# Patient Record
Sex: FEMALE | Race: WHITE | NOT HISPANIC OR LATINO | Employment: UNEMPLOYED | URBAN - METROPOLITAN AREA
[De-identification: names, ages, dates, MRNs, and addresses within clinical notes are randomized per-mention and may not be internally consistent; named-entity substitution may affect disease eponyms.]

---

## 2024-01-10 ENCOUNTER — HOSPITAL ENCOUNTER (EMERGENCY)
Facility: HOSPITAL | Age: 24
Discharge: HOME | End: 2024-01-10
Attending: SPECIALIST
Payer: MEDICAID

## 2024-01-10 VITALS
OXYGEN SATURATION: 99 % | WEIGHT: 113 LBS | HEART RATE: 102 BPM | TEMPERATURE: 98 F | DIASTOLIC BLOOD PRESSURE: 93 MMHG | RESPIRATION RATE: 16 BRPM | BODY MASS INDEX: 18.83 KG/M2 | SYSTOLIC BLOOD PRESSURE: 137 MMHG | HEIGHT: 65 IN

## 2024-01-10 DIAGNOSIS — K01.1 IMPACTED THIRD MOLAR TOOTH: Primary | ICD-10-CM

## 2024-01-10 PROCEDURE — 99284 EMERGENCY DEPT VISIT MOD MDM: CPT

## 2024-01-10 PROCEDURE — 25000003 PHARM REV CODE 250: Performed by: SPECIALIST

## 2024-01-10 RX ORDER — AMOXICILLIN 500 MG/1
500 CAPSULE ORAL
Status: COMPLETED | OUTPATIENT
Start: 2024-01-10 | End: 2024-01-10

## 2024-01-10 RX ORDER — KETOROLAC TROMETHAMINE 10 MG/1
10 TABLET, FILM COATED ORAL EVERY 6 HOURS
Qty: 15 TABLET | Refills: 0 | Status: SHIPPED | OUTPATIENT
Start: 2024-01-10 | End: 2024-01-15

## 2024-01-10 RX ORDER — KETOROLAC TROMETHAMINE 10 MG/1
10 TABLET, FILM COATED ORAL
Status: COMPLETED | OUTPATIENT
Start: 2024-01-10 | End: 2024-01-10

## 2024-01-10 RX ORDER — AMOXICILLIN 500 MG/1
500 CAPSULE ORAL 3 TIMES DAILY
Qty: 21 CAPSULE | Refills: 0 | Status: SHIPPED | OUTPATIENT
Start: 2024-01-10 | End: 2024-01-17

## 2024-01-10 RX ADMIN — AMOXICILLIN 500 MG: 500 CAPSULE ORAL at 02:01

## 2024-01-10 RX ADMIN — KETOROLAC TROMETHAMINE 10 MG: 10 TABLET, FILM COATED ORAL at 02:01

## 2024-01-10 NOTE — ED PROVIDER NOTES
Encounter Date: 1/10/2024       History     Chief Complaint   Patient presents with    Dental Pain     Pt into ED with pain to left face and ear. S/S started a few weeks ago. Pt believes her wisdom teeth may be erupting.     Patient notes pain from her wisdom teeth that seems to affect all 4 but today the left lower wisdom tooth is hurting more than the others with radiation of pain to her ear, no fever; has not seen a dentist    The history is provided by the patient.     Review of patient's allergies indicates:  No Known Allergies  No past medical history on file.  No past surgical history on file.  No family history on file.     Review of Systems   Constitutional: Negative.    HENT:  Positive for dental problem.    Respiratory: Negative.     Cardiovascular: Negative.    Gastrointestinal: Negative.    Musculoskeletal: Negative.    Skin: Negative.    Neurological: Negative.    All other systems reviewed and are negative.      Physical Exam     Initial Vitals [01/10/24 0140]   BP Pulse Resp Temp SpO2   (!) 137/93 102 16 98.3 °F (36.8 °C) 99 %      MAP       --         Physical Exam    Nursing note and vitals reviewed.  Constitutional: She appears well-developed and well-nourished.   HENT:   Head: Normocephalic and atraumatic.   Right Ear: Tympanic membrane normal.   Left Ear: Tympanic membrane normal.   Appears to have impacted 3rd molars, left lower with gum inflammation, no facial swelling   Eyes: Pupils are equal, round, and reactive to light.   Neck: Neck supple.   Normal range of motion.  Cardiovascular:  Normal rate, regular rhythm, normal heart sounds and intact distal pulses.           Pulmonary/Chest: Breath sounds normal.   Musculoskeletal:         General: Normal range of motion.      Cervical back: Normal range of motion and neck supple.     Neurological: She is alert and oriented to person, place, and time. She has normal strength.   Skin: Skin is warm and dry.         ED Course   Procedures  Labs  Reviewed - No data to display       Imaging Results    None          Medications   amoxicillin capsule 500 mg (has no administration in time range)   ketorolac tablet 10 mg (has no administration in time range)     Medical Decision Making  Patient notes pain from her wisdom teeth that seems to affect all 4 but today the left lower wisdom tooth is hurting more than the others with radiation of pain to her ear, no fever; has not seen a dentist    DIFFERENTIAL DIAGNOSIS- 3rd molar intrusion, impaction, dental caries, dental abscess    Risk  Prescription drug management.  Risk Details: Referred to a dentist; will give amoxicillin and Toradol                                      Clinical Impression:  Final diagnoses:  [K01.1] Impacted third molar tooth (Primary)          ED Disposition Condition    Discharge Stable          ED Prescriptions       Medication Sig Dispense Start Date End Date Auth. Provider    amoxicillin (AMOXIL) 500 MG capsule Take 1 capsule (500 mg total) by mouth 3 (three) times daily. for 7 days 21 capsule 1/10/2024 1/17/2024 Narendra Frey MD    ketorolac (TORADOL) 10 mg tablet Take 1 tablet (10 mg total) by mouth every 6 (six) hours. for 5 days 15 tablet 1/10/2024 1/15/2024 Narendra Frey MD          Follow-up Information       Follow up With Specialties Details Why Contact Info    Dentist                 Narendra Frey MD  01/10/24 [id number]

## 2024-03-22 ENCOUNTER — HOSPITAL ENCOUNTER (EMERGENCY)
Facility: HOSPITAL | Age: 24
Discharge: HOME | End: 2024-03-23
Attending: SPECIALIST
Payer: MEDICAID

## 2024-03-22 VITALS
TEMPERATURE: 98 F | WEIGHT: 114 LBS | HEIGHT: 65 IN | RESPIRATION RATE: 18 BRPM | SYSTOLIC BLOOD PRESSURE: 141 MMHG | OXYGEN SATURATION: 100 % | HEART RATE: 97 BPM | BODY MASS INDEX: 18.99 KG/M2 | DIASTOLIC BLOOD PRESSURE: 93 MMHG

## 2024-03-22 DIAGNOSIS — K01.1 IMPACTED THIRD MOLAR TOOTH: ICD-10-CM

## 2024-03-22 DIAGNOSIS — K08.89 PAIN, DENTAL: Primary | ICD-10-CM

## 2024-03-22 PROCEDURE — 99284 EMERGENCY DEPT VISIT MOD MDM: CPT

## 2024-03-22 RX ORDER — AMOXICILLIN 500 MG/1
500 CAPSULE ORAL
Status: COMPLETED | OUTPATIENT
Start: 2024-03-23 | End: 2024-03-23

## 2024-03-22 RX ORDER — KETOROLAC TROMETHAMINE 10 MG/1
10 TABLET, FILM COATED ORAL EVERY 6 HOURS
Qty: 15 TABLET | Refills: 0 | Status: SHIPPED | OUTPATIENT
Start: 2024-03-22 | End: 2024-03-27

## 2024-03-22 RX ORDER — KETOROLAC TROMETHAMINE 10 MG/1
10 TABLET, FILM COATED ORAL
Status: COMPLETED | OUTPATIENT
Start: 2024-03-23 | End: 2024-03-23

## 2024-03-22 RX ORDER — AMOXICILLIN 500 MG/1
500 CAPSULE ORAL 3 TIMES DAILY
Qty: 21 CAPSULE | Refills: 0 | Status: SHIPPED | OUTPATIENT
Start: 2024-03-22 | End: 2024-03-29

## 2024-03-23 PROCEDURE — 25000003 PHARM REV CODE 250: Performed by: SPECIALIST

## 2024-03-23 RX ADMIN — KETOROLAC TROMETHAMINE 10 MG: 10 TABLET, FILM COATED ORAL at 12:03

## 2024-03-23 RX ADMIN — AMOXICILLIN 500 MG: 500 CAPSULE ORAL at 12:03

## 2024-03-23 NOTE — ED PROVIDER NOTES
Encounter Date: 3/22/2024       History     Chief Complaint   Patient presents with    Dental Pain     [place] teeth coming out     Patient presents with dental pain and notes her wisdom teeth are coming out; patient was seen previously for same issue but has not made it to the dentist yet; no fever    The history is provided by the patient.     Review of patient's allergies indicates:  No Known Allergies  No past medical history on file.  No past surgical history on file.  No family history on file.     Review of Systems   Constitutional: Negative.    HENT:  Positive for dental problem.    Respiratory: Negative.     Cardiovascular: Negative.    Gastrointestinal: Negative.    Musculoskeletal: Negative.    Skin: Negative.    Neurological: Negative.    All other systems reviewed and are negative.      Physical Exam     Initial Vitals [03/22/24 2331]   BP Pulse Resp Temp SpO2   (!) 141/93 97 18 97.6 °F (36.4 °C) 100 %      MAP       --         Physical Exam    Nursing note and vitals reviewed.  Constitutional: She appears well-developed and well-nourished.   HENT:   Head: Normocephalic and atraumatic.   Third molars erupting with inflamed gum tissue surrounding the teeth   Eyes: EOM are normal. Pupils are equal, round, and reactive to light.   Neck: Neck supple.   Normal range of motion.  Cardiovascular:  Normal rate, regular rhythm, normal heart sounds and intact distal pulses.           Pulmonary/Chest: Breath sounds normal.   Musculoskeletal:         General: Normal range of motion.      Cervical back: Normal range of motion and neck supple.     Neurological: She is alert and oriented to person, place, and time. She has normal strength.   Skin: Skin is warm and dry.         ED Course   Procedures  Labs Reviewed - No data to display       Imaging Results    None          Medications   amoxicillin capsule 500 mg (500 mg Oral Given 3/23/24 0001)   ketorolac tablet 10 mg (10 mg Oral Given 3/23/24 0001)     Medical  Decision Making  Patient presents with dental pain and notes her wisdom teeth are coming out; patient was seen previously for same issue but has not made it to the dentist yet; no fever    DIFFERENTIAL DIAGNOSIS- impacted 3rd molars, dental pain    Risk  Prescription drug management.  Risk Details: Patient given Amoxil 500 mg p.o. and Toradol 10 mg p.o.; prescription sent to her pharmacy and encouraged dental follow up                                      Clinical Impression:  Final diagnoses:  [K08.89] Pain, dental (Primary)  [K01.1] Impacted third molar tooth          ED Disposition Condition    Discharge Stable          ED Prescriptions       Medication Sig Dispense Start Date End Date Auth. Provider    amoxicillin (AMOXIL) 500 MG capsule Take 1 capsule (500 mg total) by mouth 3 (three) times daily. for 7 days 21 capsule 3/22/2024 3/29/2024 Narendra Frey MD    ketorolac (TORADOL) 10 mg tablet Take 1 tablet (10 mg total) by mouth every 6 (six) hours. for 5 days 15 tablet 3/22/2024 3/27/2024 Narendra Frey MD          Follow-up Information       Follow up With Specialties Details Why Contact Info    Dentist  Schedule an appointment as soon as possible for a visit                Narendra Frey MD  03/23/24 0005

## 2025-02-03 ENCOUNTER — HOSPITAL ENCOUNTER (EMERGENCY)
Facility: HOSPITAL | Age: 25
Discharge: ANOTHER HOSPITAL | End: 2025-02-04
Attending: SPECIALIST
Payer: MEDICAID

## 2025-02-03 DIAGNOSIS — N89.8 VAGINAL DISCHARGE: ICD-10-CM

## 2025-02-03 DIAGNOSIS — N70.93 TUBO-OVARIAN ABSCESS: Primary | ICD-10-CM

## 2025-02-03 LAB
BASOPHILS # BLD AUTO: 0.01 X10(3)/MCL
BASOPHILS NFR BLD AUTO: 0.1 %
EOSINOPHIL # BLD AUTO: 0.04 X10(3)/MCL (ref 0–0.9)
EOSINOPHIL NFR BLD AUTO: 0.3 %
ERYTHROCYTE [DISTWIDTH] IN BLOOD BY AUTOMATED COUNT: 12.6 % (ref 11.5–17)
HCT VFR BLD AUTO: 38 % (ref 37–47)
HGB BLD-MCNC: 11.7 G/DL (ref 12–16)
IMM GRANULOCYTES # BLD AUTO: 0.02 X10(3)/MCL (ref 0–0.04)
IMM GRANULOCYTES NFR BLD AUTO: 0.1 %
LYMPHOCYTES # BLD AUTO: 2.32 X10(3)/MCL (ref 0.6–4.6)
LYMPHOCYTES NFR BLD AUTO: 16.2 %
MCH RBC QN AUTO: 30.2 PG (ref 27–31)
MCHC RBC AUTO-ENTMCNC: 30.8 G/DL (ref 33–36)
MCV RBC AUTO: 97.9 FL (ref 80–94)
MONOCYTES # BLD AUTO: 0.65 X10(3)/MCL (ref 0.1–1.3)
MONOCYTES NFR BLD AUTO: 4.5 %
NEUTROPHILS # BLD AUTO: 11.27 X10(3)/MCL (ref 2.1–9.2)
NEUTROPHILS NFR BLD AUTO: 78.8 %
PLATELET # BLD AUTO: 274 X10(3)/MCL (ref 130–400)
PMV BLD AUTO: 9.7 FL (ref 7.4–10.4)
RBC # BLD AUTO: 3.88 X10(6)/MCL (ref 4.2–5.4)
WBC # BLD AUTO: 14.31 X10(3)/MCL (ref 4.5–11.5)

## 2025-02-03 PROCEDURE — 86141 C-REACTIVE PROTEIN HS: CPT | Performed by: SPECIALIST

## 2025-02-03 PROCEDURE — 25000003 PHARM REV CODE 250: Performed by: SPECIALIST

## 2025-02-03 PROCEDURE — 87040 BLOOD CULTURE FOR BACTERIA: CPT | Performed by: SPECIALIST

## 2025-02-03 PROCEDURE — 99285 EMERGENCY DEPT VISIT HI MDM: CPT | Mod: 25

## 2025-02-03 PROCEDURE — 96360 HYDRATION IV INFUSION INIT: CPT

## 2025-02-03 PROCEDURE — 80053 COMPREHEN METABOLIC PANEL: CPT | Performed by: SPECIALIST

## 2025-02-03 PROCEDURE — 83605 ASSAY OF LACTIC ACID: CPT | Performed by: SPECIALIST

## 2025-02-03 PROCEDURE — 63600175 PHARM REV CODE 636 W HCPCS: Performed by: SPECIALIST

## 2025-02-03 PROCEDURE — 85025 COMPLETE CBC W/AUTO DIFF WBC: CPT | Performed by: SPECIALIST

## 2025-02-03 RX ORDER — ONDANSETRON HYDROCHLORIDE 2 MG/ML
4 INJECTION, SOLUTION INTRAVENOUS
Status: COMPLETED | OUTPATIENT
Start: 2025-02-03 | End: 2025-02-03

## 2025-02-03 RX ORDER — ACETAMINOPHEN 500 MG
1000 TABLET ORAL
Status: COMPLETED | OUTPATIENT
Start: 2025-02-03 | End: 2025-02-03

## 2025-02-03 RX ORDER — MORPHINE SULFATE 4 MG/ML
4 INJECTION, SOLUTION INTRAMUSCULAR; INTRAVENOUS
Status: COMPLETED | OUTPATIENT
Start: 2025-02-03 | End: 2025-02-03

## 2025-02-03 RX ADMIN — ACETAMINOPHEN 1000 MG: 500 TABLET ORAL at 11:02

## 2025-02-03 RX ADMIN — SODIUM CHLORIDE 1000 ML: 9 INJECTION, SOLUTION INTRAVENOUS at 11:02

## 2025-02-03 RX ADMIN — MORPHINE SULFATE 4 MG: 4 INJECTION, SOLUTION INTRAMUSCULAR; INTRAVENOUS at 11:02

## 2025-02-03 RX ADMIN — ONDANSETRON 4 MG: 2 INJECTION INTRAMUSCULAR; INTRAVENOUS at 11:02

## 2025-02-04 ENCOUNTER — HOSPITAL ENCOUNTER (INPATIENT)
Facility: HOSPITAL | Age: 25
LOS: 2 days | Discharge: HOME | DRG: 758 | End: 2025-02-06
Attending: STUDENT IN AN ORGANIZED HEALTH CARE EDUCATION/TRAINING PROGRAM | Admitting: INTERNAL MEDICINE
Payer: MEDICAID

## 2025-02-04 VITALS
SYSTOLIC BLOOD PRESSURE: 116 MMHG | HEART RATE: 104 BPM | RESPIRATION RATE: 22 BRPM | OXYGEN SATURATION: 100 % | DIASTOLIC BLOOD PRESSURE: 76 MMHG | TEMPERATURE: 98 F | HEIGHT: 65 IN | BODY MASS INDEX: 18.33 KG/M2 | WEIGHT: 110 LBS

## 2025-02-04 DIAGNOSIS — R07.9 CHEST PAIN: ICD-10-CM

## 2025-02-04 DIAGNOSIS — R10.9 ABDOMINAL PAIN, UNSPECIFIED ABDOMINAL LOCATION: ICD-10-CM

## 2025-02-04 DIAGNOSIS — K52.9 ENTERITIS: Primary | ICD-10-CM

## 2025-02-04 LAB
ACCEPTIBLE SP GR UR QL: >=1.03 (ref 1–1.03)
ALBUMIN SERPL-MCNC: 4.2 G/DL (ref 3.5–5)
ALBUMIN/GLOB SERPL: 1.1 RATIO (ref 1.1–2)
ALP SERPL-CCNC: 94 UNIT/L (ref 40–150)
ALT SERPL-CCNC: 16 UNIT/L (ref 0–55)
AMPHET UR QL SCN: POSITIVE
ANION GAP SERPL CALC-SCNC: 13 MEQ/L
AST SERPL-CCNC: 22 UNIT/L (ref 5–34)
B-HCG UR QL: NEGATIVE
BACTERIA #/AREA URNS AUTO: ABNORMAL /HPF
BARBITURATE SCN PRESENT UR: NEGATIVE
BENZODIAZ UR QL SCN: NEGATIVE
BILIRUB SERPL-MCNC: 0.5 MG/DL
BILIRUB UR QL STRIP.AUTO: NEGATIVE
BUN SERPL-MCNC: 14 MG/DL (ref 7–18.7)
C TRACH DNA SPEC QL NAA+PROBE: NOT DETECTED
CALCIUM SERPL-MCNC: 9.6 MG/DL (ref 8.4–10.2)
CANNABINOIDS UR QL SCN: NEGATIVE
CHLORIDE SERPL-SCNC: 105 MMOL/L (ref 98–107)
CLARITY UR: ABNORMAL
CO2 SERPL-SCNC: 21 MMOL/L (ref 22–29)
COCAINE UR QL SCN: NEGATIVE
COLOR UR AUTO: YELLOW
CREAT SERPL-MCNC: 0.84 MG/DL (ref 0.55–1.02)
CREAT/UREA NIT SERPL: 17
CRP SERPL HS-MCNC: 35.65 MG/L
FENTANYL UR QL SCN: NEGATIVE
GFR SERPLBLD CREATININE-BSD FMLA CKD-EPI: >60 ML/MIN/1.73/M2
GLOBULIN SER-MCNC: 4 GM/DL (ref 2.4–3.5)
GLUCOSE SERPL-MCNC: 103 MG/DL (ref 74–100)
GLUCOSE UR QL STRIP: NEGATIVE
HGB UR QL STRIP: NEGATIVE
KETONES UR QL STRIP: ABNORMAL
LACTATE SERPL-SCNC: 0.7 MMOL/L (ref 0.5–2.2)
LACTATE SERPL-SCNC: 0.9 MMOL/L (ref 0.5–2.2)
LACTATE SERPL-SCNC: 2.3 MMOL/L (ref 0.5–2.2)
LEUKOCYTE ESTERASE UR QL STRIP: ABNORMAL
MUCOUS THREADS URNS QL MICRO: ABNORMAL /LPF
N GONORRHOEA DNA SPEC QL NAA+PROBE: NOT DETECTED
NITRITE UR QL STRIP: NEGATIVE
OPIATES UR QL SCN: POSITIVE
PCP UR QL: NEGATIVE
PH UR STRIP: 6 [PH]
PH UR: 6 [PH] (ref 3–11)
POTASSIUM SERPL-SCNC: 3.4 MMOL/L (ref 3.5–5.1)
PROT SERPL-MCNC: 8.2 GM/DL (ref 6.4–8.3)
PROT UR QL STRIP: 30
RBC #/AREA URNS AUTO: ABNORMAL /HPF
SODIUM SERPL-SCNC: 139 MMOL/L (ref 136–145)
SOURCE (OHS): NORMAL
SP GR UR STRIP.AUTO: >=1.03 (ref 1–1.03)
SQUAMOUS #/AREA URNS AUTO: ABNORMAL /HPF
UROBILINOGEN UR STRIP-ACNC: 0.2
WBC #/AREA URNS AUTO: ABNORMAL /HPF

## 2025-02-04 PROCEDURE — 83605 ASSAY OF LACTIC ACID: CPT | Performed by: SPECIALIST

## 2025-02-04 PROCEDURE — 63600175 PHARM REV CODE 636 W HCPCS: Performed by: SPECIALIST

## 2025-02-04 PROCEDURE — 80307 DRUG TEST PRSMV CHEM ANLYZR: CPT | Performed by: SPECIALIST

## 2025-02-04 PROCEDURE — 83605 ASSAY OF LACTIC ACID: CPT | Performed by: STUDENT IN AN ORGANIZED HEALTH CARE EDUCATION/TRAINING PROGRAM

## 2025-02-04 PROCEDURE — 87591 N.GONORRHOEAE DNA AMP PROB: CPT | Performed by: SPECIALIST

## 2025-02-04 PROCEDURE — 63600175 PHARM REV CODE 636 W HCPCS: Performed by: STUDENT IN AN ORGANIZED HEALTH CARE EDUCATION/TRAINING PROGRAM

## 2025-02-04 PROCEDURE — 96375 TX/PRO/DX INJ NEW DRUG ADDON: CPT

## 2025-02-04 PROCEDURE — 81001 URINALYSIS AUTO W/SCOPE: CPT | Performed by: SPECIALIST

## 2025-02-04 PROCEDURE — 25000003 PHARM REV CODE 250: Performed by: NURSE PRACTITIONER

## 2025-02-04 PROCEDURE — 96374 THER/PROPH/DIAG INJ IV PUSH: CPT

## 2025-02-04 PROCEDURE — 63600175 PHARM REV CODE 636 W HCPCS: Performed by: INTERNAL MEDICINE

## 2025-02-04 PROCEDURE — 63600175 PHARM REV CODE 636 W HCPCS: Mod: TB,JZ | Performed by: INTERNAL MEDICINE

## 2025-02-04 PROCEDURE — 25000003 PHARM REV CODE 250: Performed by: SPECIALIST

## 2025-02-04 PROCEDURE — 21400001 HC TELEMETRY ROOM

## 2025-02-04 PROCEDURE — 81025 URINE PREGNANCY TEST: CPT | Performed by: SPECIALIST

## 2025-02-04 PROCEDURE — 99285 EMERGENCY DEPT VISIT HI MDM: CPT | Mod: 25

## 2025-02-04 PROCEDURE — 25500020 PHARM REV CODE 255: Performed by: SPECIALIST

## 2025-02-04 PROCEDURE — 87086 URINE CULTURE/COLONY COUNT: CPT | Performed by: SPECIALIST

## 2025-02-04 RX ORDER — MORPHINE SULFATE 4 MG/ML
4 INJECTION, SOLUTION INTRAMUSCULAR; INTRAVENOUS
Status: COMPLETED | OUTPATIENT
Start: 2025-02-04 | End: 2025-02-04

## 2025-02-04 RX ORDER — PROCHLORPERAZINE EDISYLATE 5 MG/ML
5 INJECTION INTRAMUSCULAR; INTRAVENOUS EVERY 6 HOURS PRN
Status: DISCONTINUED | OUTPATIENT
Start: 2025-02-04 | End: 2025-02-06 | Stop reason: HOSPADM

## 2025-02-04 RX ORDER — DOXYCYCLINE HYCLATE 100 MG
100 TABLET ORAL
Status: COMPLETED | OUTPATIENT
Start: 2025-02-04 | End: 2025-02-04

## 2025-02-04 RX ORDER — SODIUM CHLORIDE 0.9 % (FLUSH) 0.9 %
10 SYRINGE (ML) INJECTION
Status: DISCONTINUED | OUTPATIENT
Start: 2025-02-04 | End: 2025-02-06 | Stop reason: HOSPADM

## 2025-02-04 RX ORDER — ACETAMINOPHEN 500 MG
1000 TABLET ORAL EVERY 6 HOURS PRN
Status: DISCONTINUED | OUTPATIENT
Start: 2025-02-04 | End: 2025-02-06 | Stop reason: HOSPADM

## 2025-02-04 RX ORDER — BISACODYL 10 MG/1
10 SUPPOSITORY RECTAL DAILY PRN
Status: DISCONTINUED | OUTPATIENT
Start: 2025-02-04 | End: 2025-02-06 | Stop reason: HOSPADM

## 2025-02-04 RX ORDER — IBUPROFEN 200 MG
16 TABLET ORAL
Status: DISCONTINUED | OUTPATIENT
Start: 2025-02-04 | End: 2025-02-06 | Stop reason: HOSPADM

## 2025-02-04 RX ORDER — CEFTRIAXONE 1 G/1
1 INJECTION, POWDER, FOR SOLUTION INTRAMUSCULAR; INTRAVENOUS
Status: COMPLETED | OUTPATIENT
Start: 2025-02-04 | End: 2025-02-04

## 2025-02-04 RX ORDER — ALUMINUM HYDROXIDE, MAGNESIUM HYDROXIDE, AND SIMETHICONE 1200; 120; 1200 MG/30ML; MG/30ML; MG/30ML
30 SUSPENSION ORAL 4 TIMES DAILY PRN
Status: DISCONTINUED | OUTPATIENT
Start: 2025-02-04 | End: 2025-02-06 | Stop reason: HOSPADM

## 2025-02-04 RX ORDER — MORPHINE SULFATE 4 MG/ML
4 INJECTION, SOLUTION INTRAMUSCULAR; INTRAVENOUS EVERY 4 HOURS PRN
Status: DISCONTINUED | OUTPATIENT
Start: 2025-02-04 | End: 2025-02-06 | Stop reason: HOSPADM

## 2025-02-04 RX ORDER — AMOXICILLIN 250 MG
1 CAPSULE ORAL 2 TIMES DAILY PRN
Status: DISCONTINUED | OUTPATIENT
Start: 2025-02-04 | End: 2025-02-06 | Stop reason: HOSPADM

## 2025-02-04 RX ORDER — ONDANSETRON HYDROCHLORIDE 2 MG/ML
4 INJECTION, SOLUTION INTRAVENOUS EVERY 4 HOURS PRN
Status: DISCONTINUED | OUTPATIENT
Start: 2025-02-04 | End: 2025-02-06 | Stop reason: HOSPADM

## 2025-02-04 RX ORDER — CEFTRIAXONE 1 G/1
1 INJECTION, POWDER, FOR SOLUTION INTRAMUSCULAR; INTRAVENOUS
Status: DISCONTINUED | OUTPATIENT
Start: 2025-02-05 | End: 2025-02-05

## 2025-02-04 RX ORDER — METRONIDAZOLE 500 MG/100ML
500 INJECTION, SOLUTION INTRAVENOUS
Status: COMPLETED | OUTPATIENT
Start: 2025-02-04 | End: 2025-02-04

## 2025-02-04 RX ORDER — ONDANSETRON HYDROCHLORIDE 2 MG/ML
4 INJECTION, SOLUTION INTRAVENOUS
Status: COMPLETED | OUTPATIENT
Start: 2025-02-04 | End: 2025-02-04

## 2025-02-04 RX ORDER — TALC
6 POWDER (GRAM) TOPICAL NIGHTLY PRN
Status: DISCONTINUED | OUTPATIENT
Start: 2025-02-04 | End: 2025-02-06 | Stop reason: HOSPADM

## 2025-02-04 RX ORDER — GLUCAGON 1 MG
1 KIT INJECTION
Status: DISCONTINUED | OUTPATIENT
Start: 2025-02-04 | End: 2025-02-06 | Stop reason: HOSPADM

## 2025-02-04 RX ORDER — NALOXONE HCL 0.4 MG/ML
0.02 VIAL (ML) INJECTION
Status: DISCONTINUED | OUTPATIENT
Start: 2025-02-04 | End: 2025-02-06 | Stop reason: HOSPADM

## 2025-02-04 RX ORDER — KETOROLAC TROMETHAMINE 30 MG/ML
30 INJECTION, SOLUTION INTRAMUSCULAR; INTRAVENOUS EVERY 6 HOURS PRN
Status: DISCONTINUED | OUTPATIENT
Start: 2025-02-04 | End: 2025-02-06 | Stop reason: HOSPADM

## 2025-02-04 RX ORDER — METRONIDAZOLE 500 MG/100ML
500 INJECTION, SOLUTION INTRAVENOUS
Status: DISCONTINUED | OUTPATIENT
Start: 2025-02-04 | End: 2025-02-05

## 2025-02-04 RX ORDER — IBUPROFEN 200 MG
24 TABLET ORAL
Status: DISCONTINUED | OUTPATIENT
Start: 2025-02-04 | End: 2025-02-06 | Stop reason: HOSPADM

## 2025-02-04 RX ADMIN — KETOROLAC TROMETHAMINE 30 MG: 30 INJECTION, SOLUTION INTRAMUSCULAR at 07:02

## 2025-02-04 RX ADMIN — IOHEXOL 100 ML: 350 INJECTION, SOLUTION INTRAVENOUS at 12:02

## 2025-02-04 RX ADMIN — SODIUM CHLORIDE, POTASSIUM CHLORIDE, SODIUM LACTATE AND CALCIUM CHLORIDE 1000 ML: 600; 310; 30; 20 INJECTION, SOLUTION INTRAVENOUS at 05:02

## 2025-02-04 RX ADMIN — ACETAMINOPHEN 1000 MG: 500 TABLET ORAL at 11:02

## 2025-02-04 RX ADMIN — METRONIDAZOLE 500 MG: 5 INJECTION, SOLUTION INTRAVENOUS at 04:02

## 2025-02-04 RX ADMIN — ONDANSETRON 4 MG: 2 INJECTION INTRAMUSCULAR; INTRAVENOUS at 05:02

## 2025-02-04 RX ADMIN — MORPHINE SULFATE 4 MG: 4 INJECTION, SOLUTION INTRAMUSCULAR; INTRAVENOUS at 05:02

## 2025-02-04 RX ADMIN — CEFTRIAXONE SODIUM 1 G: 1 INJECTION, POWDER, FOR SOLUTION INTRAMUSCULAR; INTRAVENOUS at 01:02

## 2025-02-04 RX ADMIN — METRONIDAZOLE 500 MG: 500 INJECTION, SOLUTION INTRAVENOUS at 01:02

## 2025-02-04 RX ADMIN — ACETAMINOPHEN 1000 MG: 500 TABLET ORAL at 07:02

## 2025-02-04 RX ADMIN — DOXYCYCLINE HYCLATE 100 MG: 100 TABLET, COATED ORAL at 01:02

## 2025-02-04 RX ADMIN — KETOROLAC TROMETHAMINE 30 MG: 30 INJECTION, SOLUTION INTRAMUSCULAR at 10:02

## 2025-02-04 NOTE — DISCHARGE INSTRUCTIONS
Thanks for letting use take care of you today! It is our goal to give you courteous care and to keep you comfortable and informed. If you have any questions before you leave I will be happy to try and answer them.     Advice after your visit:  Your visit in the emergency department is NOT definitive care - please follow-up with your primary care doctor and/or specialist within 1-2 days. If you do not have a primary care physician call [phone] to schedule an appointment. Please return if you have any worsening in your condition or if you have any other concerns.    Return to the emergency department if any worsening symptoms including fever, chest pain, difficulty breathing, weakness, numbness, tingling, nausea, vomiting, inability to eat, drink or take your medication, or any other new symptoms or concerns arise.      Please signup for MyChart as noted below in your paperwork to review all labwork, imaging results, and any other incidental findings from today's visit.     If you had radiology exams like an XRAY or CT in the emergency Department the interpreation on them may be preliminary - there may be less time sensitive findings on the reports please obtain these reports within 24 hours from the hospital or by using your out on your mobile phone to access records.  Bring these to your primary care doctor and/or specialist for further review of incidental findings.    Please review any LAB WORK from your visit today with your primary care physician.    If you were prescribed OPIATE PAIN MEDICATION - please understand of these medications can be addictive, you may fill less of the prescription was written for, you do not have to take the full prescription.  You may discard what you do not use.  Please seek help if you feel you are having problems with addiction.  Do not drive or operate heavy machinery if you are taking sedating medications.  Do not mix these medications with alcohol.      If you had a SPLINT  placed in the emergency department if you have severe pain numbness tingling or discoloration of year digits please remove the splint and return to the emergency department for further evaluation as this may represent a sign of compromise to the nerves or blood vessels due to swelling.    If you had SUTURES in the emergency department please have them removed in the prescribed time frame typically within 7-14 days.  You may shower but please do not bathe or swim.  Keep the wounds clean and dry and covered with a clean dressing.  Please return if he have any signs of infection like redness or drainage or pain at the suture site.    Please take the full course of  any ANTIBIOTICS you were prescribed - incomplete courses of antibiotics can cause resistance to antibiotics in the future which will make it difficult to treat any infections you may have.

## 2025-02-04 NOTE — CONSULTS
This Para1 female patient admitted secondary to abdominal pain. Reports her pain began 2 days ago. She reports the pain to wax and wane. She currently rates her pain and 5/10. She denies fever, chills, or diaphoresis. Her only currently addition complaint is a vaginal discharge, whitish in color with a foul smell as per patient  Based on the ultrasound findings, there is no acute gyn intervention. She has no abnormal pelvic ultrasound findings. And her CT scan showed a possible 1.2cm hydrosalpinx, that was not seen on ultrasound.      Abd: +rebound tenderness, non-tender to deep palpation  No right or left adnexal tenderness    VE: NA    A/P: Unlikely TUBO-OVARIAN ABSCESS  -would recommend to continue with rocephin and flagyl until discharge  -no harm by giving the patient 500mg flagyl BID x 7 days for her symptoms of bacterial vaginosis  -no acute gyn intervention

## 2025-02-04 NOTE — Clinical Note
Diagnosis: Enteritis [243191]   Future Attending Provider: CIARA BERMAN [[id number]]   Admit to which facility:: [facility] [[id number]]

## 2025-02-04 NOTE — H&P
[facility]  Hospital Medicine History & Physical Examination       Patient Name: Verona Moore  MRN: [id number]  Patient Class: OP- Observation   Admission Date: 02/04/2025   Admitting Service: Hospital Medicine   Length of Stay: 0  Attending Physician: Dr. Harvey   Primary Care Provider: Denita, Primary Doctor  Face-to-Face encounter date: 02/04/2025  Code Status: Full  Chief Complaint: TRANSFER (Pt arrives via [facility] for Tubo-ovarian Abscess)      Patient information was obtained from patient, patient's family, past medical records and ED records.    HISTORY OF PRESENT ILLNESS:   24 y.o. female with no significant PMHx who presented to [facility] on 2/4/2025 via EMS as a transfer from [facility] ED for higher level of care.  She initially presented to the [facility] with complaints of bilateral lower quadrant abdominal pain that began on the morning of presentation, 02/03/2025.  Denied any nausea, vomiting, diarrhea, constipation, vaginal bleeding, vaginal discharge.  Abdominal pain described as cramping.      Initial labs were notable for WBC 14.31, hemoglobin 11.7, potassium 3.4, CO2 21, glucose 103, CRP 35.65, lactic acid 2.3.  UDS positive for opiates and amphetamines.  Urinalysis indicative of UTI with many bacteria.  Blood cultures x2 and urine culture pending.  CT abdomen and pelvis with IV contrast demonstrated trace pelvic ascites with suspected right hydrosalpinx.  Preliminary report mentioned possible tubo-ovarian abscess, however final interpretation did not demonstrate any fluid collection.  Pelvic ultrasound unremarkable.  She was transferred to [facility] for gynecology services.  She received 1 L of NS, IV Flagyl, doxycycline, morphine 4 mg IV and Zofran 4 mg IV prior to transfer.  Admitted to hospital medicine service.    REVIEW OF SYSTEMS:   Except as documented, all other systems reviewed and negative.    PAST MEDICAL HISTORY:   Reviewed and negative    PAST  SURGICAL HISTORY:   Reviewed and negative    FAMILY HISTORY:   Reviewed and negative    SOCIAL HISTORY:   Denied alcohol, tobacco or illicit drug use.     SCREENING FOR SOCIAL DRIVERS FOR HEALTH:   Patient screened for food insecurity, housing instability, transportation needs, utility difficulties, and interpersonal safety (select all that apply as identified as concern):  []Housing or Food  []Transportation Needs  []Utility Difficulties  []Interpersonal safety  [x]None    ALLERGIES:   Patient has no known allergies.    HOME MEDICATIONS:     Prior to Admission medications    Not on File     ________________________________________________________________________  INPATIENT LIST OF MEDICATIONS     Current Facility-Administered Medications:     acetaminophen tablet 1,000 mg, 1,000 mg, Oral, Q6H PRN, Michelle Contreras L, FNP, 1,000 mg at 02/04/25 0720    aluminum-magnesium hydroxide-simethicone 200-200-20 mg/5 mL suspension 30 mL, 30 mL, Oral, QID PRN, Michelle Contreras L, FNP    bisacodyL suppository 10 mg, 10 mg, Rectal, Daily PRN, Michelle Contreras L, FNP    dextrose 50% injection 12.5 g, 12.5 g, Intravenous, PRN, Sidney Contrerasy L, FNP    dextrose 50% injection 25 g, 25 g, Intravenous, PRN, Sidney Contrerasy L, FNP    glucagon (human recombinant) injection 1 mg, 1 mg, Intramuscular, PRN, Michelle Contreras L, FNP    glucose chewable tablet 16 g, 16 g, Oral, PRN, Michelle Contreras L, FNP    glucose chewable tablet 24 g, 24 g, Oral, PRN, Michelle Contreras L, FNP    melatonin tablet 6 mg, 6 mg, Oral, Nightly PRN, Michelle Contreras L, FNP    naloxone 0.4 mg/mL injection 0.02 mg, 0.02 mg, Intravenous, PRN, Michelle Contreras L, FNP    ondansetron injection 4 mg, 4 mg, Intravenous, Q4H PRN, Michelle Contreras L, FNP    prochlorperazine injection Soln 5 mg, 5 mg, Intravenous, Q6H PRN, Michelle Contreras, DEZP    senna-docusate 8.6-50 mg per tablet 1 tablet, 1 tablet, Oral, BID PRN, Michelle Contreras, DEZP    sodium chloride 0.9% flush 10 mL, 10  mL, Intravenous, PRN, Michelle Contreras, JACKIE  No current outpatient medications on file.    Scheduled Meds:  Continuous Infusions:  PRN Meds:.  Current Facility-Administered Medications:     acetaminophen, 1,000 mg, Oral, Q6H PRN    aluminum-magnesium hydroxide-simethicone, 30 mL, Oral, QID PRN    bisacodyL, 10 mg, Rectal, Daily PRN    dextrose 50%, 12.5 g, Intravenous, PRN    dextrose 50%, 25 g, Intravenous, PRN    glucagon (human recombinant), 1 mg, Intramuscular, PRN    glucose, 16 g, Oral, PRN    glucose, 24 g, Oral, PRN    melatonin, 6 mg, Oral, Nightly PRN    naloxone, 0.02 mg, Intravenous, PRN    ondansetron, 4 mg, Intravenous, Q4H PRN    prochlorperazine, 5 mg, Intravenous, Q6H PRN    senna-docusate 8.6-50 mg, 1 tablet, Oral, BID PRN    sodium chloride 0.9%, 10 mL, Intravenous, PRN    PHYSICAL EXAM:     VITAL SIGNS: 24 HRS MIN & MAX LAST   Temp  Min: 97.8 °F (36.6 °C)  Max: 98.7 °F (37.1 °C) 98.7 °F (37.1 °C)   BP  Min: 100/38  Max: 123/87 110/60   Pulse  Min: 81  Max: 104  86   Resp  Min: 16  Max: 23 (!) 23   SpO2  Min: 94 %  Max: 100 % 98 %       General appearance: Well-developed female in no apparent distress.  HENT: Atraumatic head. Moist mucous membranes of oral cavity.  Eyes: Normal extraocular movements.   Neck: Supple.   Lungs: Clear to auscultation bilaterally.   Heart: Regular rate and rhythm. S1 and S2 present. No pedal edema.  Abdomen: Soft, BLE + TTP  Extremities: No cyanosis, clubbing, or edema.  Skin: No Rash.   Neuro: Motor and sensory exams grossly intact.   Psych/mental status: Awake and alert. Appropriate mood and affect. Responds appropriately to questions.     LABS AND IMAGING:     Recent Labs   Lab 02/03/25  2342   WBC 14.31*   RBC 3.88*   HGB 11.7*   HCT 38.0   MCV 97.9*   MCH 30.2   MCHC 30.8*   RDW 12.6      MPV 9.7       Recent Labs   Lab 02/03/25  2342      K 3.4*      CO2 21*   BUN 14.0   CREATININE 0.84   CALCIUM 9.6   ALBUMIN 4.2   ALKPHOS 94   ALT 16   AST  22   BILITOT 0.5       Microbiology Results (last 7 days)       ** No results found for the last 168 hours. **             CT Abdomen Pelvis With IV Contrast NO Oral Contrast  Narrative: EXAMINATION:  CT ABDOMEN PELVIS WITH IV CONTRAST    CLINICAL HISTORY:  Abdominal pain, acute, nonlocalized;    TECHNIQUE:  CT imaging of the abdomen and pelvis after intravenous contrast. Dose length product 318 mGycm. Automatic exposure control, adjustment of mA/kV or iterative reconstruction technique used to limit radiation dose.    COMPARISON:  No relevant comparison studies available at the time of dictation.    FINDINGS:  Liver/biliary: Normal liver.  No defined radiodense gallstones. No intra or extrahepatic biliary ductal dilation.    Pancreas: Normal.    Spleen: Normal.    Adrenals: Normal.    Genitourinary: Symmetric renal enhancement. No hydronephrosis. Bladder decompressed and not well evaluated.  Right-sided hydrosalpinx measuring about 12 mm in diameter.    Stomach/bowel: No evidence of bowel obstruction. Normal appendix.    Lymph nodes/peritoneum: No pathologically enlarged lymph node identified. Trace pelvic ascites.    Vasculature: Normal abdominal aortic caliber.    Abdominal wall: Umbilical piercing.    Lung bases: No consolidation or pleural effusion.    Musculoskeletal: No acute osseous findings.  Impression: Trace pelvic ascites with suspected right hydrosalpinx.    Preliminary report for this study mentioned possible tubo-ovarian abscess.  I see no defined fluid collection at this time.  Otherwise, no significant discrepancy with the preliminary report.    Electronically signed by: Filiberto Fernandez  Date:    02/04/2025  Time:    08:52  US PELVIS COMPLETE NON OB WITH DOPPLER (XPD)  Narrative: Technique: Transabdominal ultrasound of the pelvis was performed.    Comparison: Correlation is with CT study of the abdomen and pelvis of the same date.    CLINICAL HISTORY: Concern for TOA on CT.    Findings:    Uterus: The  uterus measures 7.9 x 5.3 x 3.9 cm. The endometrium measures 14.5 mm in thickness. This is within normal limits.    Adnexa:    Right Ovary: The right ovary measures 4.9 x 4.2 x 3.3 cm. A hypoechoic focus is seen which could reflect a corpus luteum cyst which measures 1.4 x 2.1 x 1.6 cm (series 1, image 29). The right ovarian blood flow is within normal limits.    Left Ovary: The left ovary measures 3.2 x 3.1 x 2.8 cm. A large likely corpus luteum cyst is seen which measures 2.6 x 2.2 x 2.4 cm. The left ovarian blood flow is within normal limits.    Fluid: No free fluid is seen in the pelvis.    Notification: The results were discussed with the emergency room physician (Dr Lopez) prior to dictation at 2025-02-04 04:21:00 CST.  Impression: Impression:    No acute findings with sonography identified.    No significant discrepancy with overnight report.    Electronically signed by: Reymundo Buitrago  Date:    02/04/2025  Time:    07:23        ASSESSMENT:   Sepsis secondary to Acute UTI, POA  Suspected right hydrosalpinx on CT  Lactic Acidosis   Polysubstance abuse   Hypokalemia   Elevated CRP       PLAN:   Transferred to this facility for gynecology services due to concern for possible tubo-ovarian abscess, however final CT interpretation did not note aforementioned abscess, did not suspected right hydrosalpinx. GYN consulted    IV ceftriaxone daily   Follow blood and urine cultures  Replete potassium   P.r.n. analgesics  Diet as tolerated  Resume home medications as deemed appropriate once medication reconciliation is updated  Labs in AM    VTE Prophylaxis: SCDs    Discharge Planning and Disposition: DEMETRICE    I, Alix Loving, NORMAN have reviewed and discussed the case with Dr. Harvey.  Please see the attending MD's addendum for further assessment and plan.    Alix Loving, MARY ANN-BC  Department of Hospital Medicine   [facility]   02/04/2025    This note was created with the assistance of  MModal Voice Recognition Software. There may be transcription errors as a result of using this technology however minimal, and effort has been made to assure accuracy of transcription, but any obvious errors or omissions should be clarified with the author of the document.    _______________________________________________________________________________  MD Addendum:  I,  , assumed care of this patient today at --am/pm  For the patient encounter, I performed the substantive portion of the visit, I reviewed the NP/PA documentation, treatment plan, and medical decision making.  I had face to face time with this patient     A. History:    B. Physical exam:    C. Medical decision making:      All diagnosis and differential diagnosis have been reviewed; assessment and plan has been documented; I have personally reviewed the labs and test results that are presently available; I have reviewed the patients medication list; I have reviewed the consulting providers response and recommendations. I have reviewed or attempted to review medical records based upon their availability.    All of the patient and family questions have been addressed and answered. Patient's is agreeable to the above stated plan. I will continue to monitor closely and make adjustments to medical management as needed.      02/04/2025

## 2025-02-04 NOTE — ED PROVIDER NOTES
Encounter Date: 2/3/2025       History     Chief Complaint   Patient presents with    Abdominal Cramping     Abd pain, no vomiting. Started this AM. Unknown LBM     24-year-old female reports abdominal pain right and left lower abdomen that began this morning, described as cramping, no nausea or vomiting, no diarrhea, no constipation but unsure when her last bowel movement was; patient arrived by EMS holding her abdomen and appeared to be in mild-to-moderate distress, crying and at times difficult to redirect when asked questions    The history is provided by the patient and the EMS personnel.     Review of patient's allergies indicates:  No Known Allergies  No past medical history on file.  No past surgical history on file.  No family history on file.     Review of Systems   Constitutional: Negative.    HENT:  Positive for dental problem.    Respiratory: Negative.     Cardiovascular: Negative.    Gastrointestinal: Negative.    Musculoskeletal: Negative.    Skin: Negative.    Neurological: Negative.    All other systems reviewed and are negative.      Physical Exam     Initial Vitals [02/03/25 2313]   BP Pulse Resp Temp SpO2   (!) 100/38 81 18 97.8 °F (36.6 °C) 100 %      MAP       --         Physical Exam    Nursing note and vitals reviewed.  Constitutional: She appears well-developed and well-nourished.   HENT:   Head: Normocephalic and atraumatic. Mouth/Throat: Oropharynx is clear and moist.   Some missing teeth   Eyes: EOM are normal. Pupils are equal, round, and reactive to light.   Neck: Neck supple.   Normal range of motion.  Cardiovascular:  Normal rate, regular rhythm, normal heart sounds and intact distal pulses.           Pulmonary/Chest: Breath sounds normal.   Abdominal: Abdomen is soft. She exhibits no distension. There is abdominal tenderness (bilateral lower abdomen). There is guarding. There is no rebound.   Musculoskeletal:         General: Normal range of motion.      Cervical back: Normal range  of motion and neck supple.     Neurological: She is alert and oriented to person, place, and time. She has normal strength. GCS score is 15. GCS eye subscore is 4. GCS verbal subscore is 5. GCS motor subscore is 6.   Skin: Skin is warm and dry.         ED Course   Procedures  Labs Reviewed   DRUG SCREEN, URINE (BEAKER) - Abnormal       Result Value    Amphetamines, Urine Positive (*)     Barbiturates, Urine Negative      Benzodiazepine, Urine Negative      Cannabinoids, Urine Negative      Cocaine, Urine Negative      Opiates, Urine Positive (*)     Phencyclidine, Urine Negative      Fentanyl, Urine Negative      pH, Urine 6.0      Specific Gravity, Urine Auto >=1.030      Narrative:     Cut off concentrations:    Amphetamines - 1000 ng/ml  Barbiturates - 200 ng/ml  Benzodiazepine - 200 ng/ml  Cannabinoids (THC) - 50 ng/ml  Cocaine - 300 ng/ml  Fentanyl - 1.0 ng/ml  MDMA - 500 ng/ml  Opiates - 300 ng/ml   Phencyclidine (PCP) - 25 ng/ml    Specimen submitted for drug analysis and tested for pH and specific gravity in order to evaluate sample integrity. Suspect tampering if specific gravity is <1.003 and/or pH is not within the range of 4.5 - 8.0  False negatives may result form substances such as bleach added to urine.  False positives may result for the presence of a substance with similar chemical structure to the drug or its metabolite.    This test provides only a PRELIMINARY analytical test result. A more specific alternate chemical method must be used in order to obtain a confirmed analytical result. Gas chromatography/mass spectrometry (GC/MS) is the preferred confirmatory method. Other chemical confirmation methods are available. Clinical consideration and professional judgement should be applied to any drug of abuse test result, particularly when preliminary positive results are used.    Positive results will be confirmed only at the physicians request. Unconfirmed screening results are to be used only for  medical purposes (treatment).        URINALYSIS, REFLEX TO URINE CULTURE - Abnormal    Color, UA Yellow      Appearance, UA Slightly Cloudy (*)     Specific Gravity, UA >=1.030      pH, UA 6.0      Protein, UA 30 (*)     Glucose, UA Negative      Ketones, UA Trace (*)     Blood, UA Negative      Bilirubin, UA Negative      Urobilinogen, UA 0.2      Nitrites, UA Negative      Leukocyte Esterase, UA Small (*)    LACTIC ACID, PLASMA - Abnormal    Lactic Acid Level 2.3 (*)    COMPREHENSIVE METABOLIC PANEL - Abnormal    Sodium 139      Potassium 3.4 (*)     Chloride 105      CO2 21 (*)     Glucose 103 (*)     Blood Urea Nitrogen 14.0      Creatinine 0.84      Calcium 9.6      Protein Total 8.2      Albumin 4.2      Globulin 4.0 (*)     Albumin/Globulin Ratio 1.1      Bilirubin Total 0.5      ALP 94      ALT 16      AST 22      eGFR >60      Anion Gap 13.0      BUN/Creatinine Ratio 17     CBC WITH DIFFERENTIAL - Abnormal    WBC 14.31 (*)     RBC 3.88 (*)     Hgb 11.7 (*)     Hct 38.0      MCV 97.9 (*)     MCH 30.2      MCHC 30.8 (*)     RDW 12.6      Platelet 274      MPV 9.7      Neut % 78.8      Lymph % 16.2      Mono % 4.5      Eos % 0.3      Basophil % 0.1      Imm Grans % 0.1      Neut # 11.27 (*)     Lymph # 2.32      Mono # 0.65      Eos # 0.04      Baso # 0.01      Imm Gran # 0.02     URINALYSIS, MICROSCOPIC - Abnormal    Bacteria, UA Many (*)     Mucous, UA Moderate (*)     RBC, UA 0-2      WBC, UA 11-20 (*)     Squamous Epithelial Cells, UA Many (*)    HIGH SENSITIVITY CRP - Abnormal    C-Reactive Protein High Sensitivity 35.65 (*)    PREGNANCY TEST, URINE RAPID - Normal    hCG Qualitative, Urine Negative     LACTIC ACID, PLASMA - Normal    Lactic Acid Level 0.7     CHLAMYDIA/GONORRHOEAE(GC), PCR    Chlamydia trachomatis PCR Not Detected      N. gonorrhea PCR Not Detected      Source Urine      Narrative:     The Xpert CT/NG test, performed on the Sim[employer] system is a qualitative in vitro real-time polymerase  chain reaction (PCR) test for the automated detected and differentiation for genomic DNA from Chlamydia trachomatis (CT) and/or Neisseria gonorrhoeae (NG).   BLOOD CULTURE [facility]   BLOOD CULTURE [facility]   CULTURE, URINE   CBC W/ AUTO DIFFERENTIAL    Narrative:     The following orders were created for panel order CBC auto differential.  Procedure                               Abnormality         Status                     ---------                               -----------         ------                     CBC with Differential[[id number]]       Abnormal            Final result                 Please view results for these tests on the individual orders.          Imaging Results              CT Abdomen Pelvis With IV Contrast NO Oral Contrast (Final result)  Result time 02/04/25 08:52:37   Notes recorded by ALBERTO Almazan on 2/4/2025 at 9:06 AM CST  Pt was transferred to [facility] ER for further work-up, further imaging completed at facility and no further workup required- ALBERTO BOLANOS      Final result by Filiberto Fernandez MD (02/04/25 08:52:37)                   Impression:      Trace pelvic ascites with suspected right hydrosalpinx.    Preliminary report for this study mentioned possible tubo-ovarian abscess.  I see no defined fluid collection at this time.  Otherwise, no significant discrepancy with the preliminary report.      Electronically signed by: Filiberto Fernandez  Date:    02/04/2025  Time:    08:52               Narrative:    EXAMINATION:  CT ABDOMEN PELVIS WITH IV CONTRAST    CLINICAL HISTORY:  Abdominal pain, acute, nonlocalized;    TECHNIQUE:  CT imaging of the abdomen and pelvis after intravenous contrast. Dose length product 318 mGycm. Automatic exposure control, adjustment of mA/kV or iterative reconstruction technique used to limit radiation dose.    COMPARISON:  No relevant comparison studies available at the time of dictation.    FINDINGS:  Liver/biliary: Normal liver.  No defined radiodense gallstones. No intra or  extrahepatic biliary ductal dilation.    Pancreas: Normal.    Spleen: Normal.    Adrenals: Normal.    Genitourinary: Symmetric renal enhancement. No hydronephrosis. Bladder decompressed and not well evaluated.  Right-sided hydrosalpinx measuring about 12 mm in diameter.    Stomach/bowel: No evidence of bowel obstruction. Normal appendix.    Lymph nodes/peritoneum: No pathologically enlarged lymph node identified. Trace pelvic ascites.    Vasculature: Normal abdominal aortic caliber.    Abdominal wall: Umbilical piercing.    Lung bases: No consolidation or pleural effusion.    Musculoskeletal: No acute osseous findings.                        Preliminary result by Bennett Quinones MD (02/04/25 01:01:03)                   Impression:    1. There are tubulocystic changes with thick enhancing walls in both adnexae, with the largest cystic component measuring 2.1 Â-- 1.9 cm on the right and 2.2 Â-- 3.1 cm on the left (series 3, images 71-74). This is associated with moderate fat stranding and free fluid in the pelvis. These findings could reflect tubo-ovarian abscess. There is mild fluid in the endometrial cavity which may reflect hydrometra. Correlate with clinical and laboratory findings as regards additional evaluation and follow-up to full resolution as indicated.  2. No acute intraabdominal or pelvic solid organ or bowel pathology identified. Details and other findings as discussed above.               Narrative:    START OF REPORT:  Technique: CT of the abdomen and pelvis was performed with axial images as well as sagittal and coronal reconstruction images with intravenous contrast.    Comparison: None available.    Clinical History: Abdominal Cramping Abd pain, no vomiting. Started this AM. Unknown LBM.    Dosage Information: Automated Exposure Control was utilized 318.34 mGy.cm.    Findings:  Lines and Tubes: None.  Thorax:  Lungs: The visualized lung bases appear unremarkable.  Pleura: No effusions or  thickening are seen.  Heart: The heart size is within normal limits.  Abdomen:  Abdominal Wall: No abdominal wall pathology is seen.  Liver: The liver appears unremarkable.  Biliary System: No intrahepatic or extrahepatic biliary duct dilatation is seen.  Gallbladder: The gallbladder appears unremarkable with no stones wall thickening or pericholecystic inflammatory changes or fluid.  Pancreas: The pancreas appears unremarkable.  Spleen: The spleen appears unremarkable.  Adrenals: The adrenal glands appear unremarkable.  Kidneys: The kidneys appear unremarkable with no stones cysts masses or hydronephrosis.  Aorta: The visualized abdominal aorta appears unremarkable.  IVC: Unremarkable.  Bowel:  Esophagus: The visualized distal esophagus appears unremarkable.  Stomach: The stomach appears unremarkable.  Duodenum: Unremarkable appearing duodenum.  Small Bowel: The small bowel appears unremarkable.  Colon: There is moderate stool in the rectosigmoid colon which could reflect an element of constipation.  Appendix: The appendix appears unremarkable and is seen on Image 66, Series 3 through Image 63, Series 3.  Peritoneum: No free intraperitoneal air is seen.    Pelvis:  Bladder: The bladder is nondistended but appears otherwise unremarkable.  Female: There are tubulocystic changes with thick enhancing walls in both adnexae, with the largest cystic component measuring 2.1 Â-- 1.9 cm on the right and 2.2 Â-- 3.1 cm on the left (series 3, images 71-74). This is associated with moderate fat stranding and free fluid in the pelvis. These findings could reflect tubo-ovarian abscess. There is mild fluid in the endometrial cavity which may reflect hydrometra.    Bony structures:  Dorsal Spine: The visualized dorsal spine appears unremarkable.  Bony Pelvis: The visualized bony structures of the pelvis appear unremarkable.                                         Medications   acetaminophen tablet 1,000 mg (1,000 mg Oral Given  2/3/25 2348)   sodium chloride 0.9% bolus 1,000 mL 1,000 mL (0 mLs Intravenous Stopped 2/4/25 0049)   ondansetron injection 4 mg (4 mg Intravenous Given 2/3/25 2330)   morphine injection 4 mg (4 mg Intravenous Given 2/3/25 2331)   iohexoL (OMNIPAQUE 350) injection 100 mL (100 mLs Intravenous Given 2/4/25 0029)   cefTRIAXone injection 1 g (1 g Intravenous Given 2/4/25 0123)   metronidazole IVPB 500 mg (0 mg Intravenous Stopped 2/4/25 0211)   doxycycline tablet 100 mg (100 mg Oral Given 2/4/25 0123)     Medical Decision Making  24-year-old female reports abdominal pain right and left lower abdomen that began this morning, described as cramping, no nausea or vomiting, no diarrhea, no constipation but unsure when her last bowel movement was; patient arrived by EMS holding her abdomen and appeared to be in mild-to-moderate distress, crying and at times difficult to redirect when asked questions    DIFFERENTIAL DIAGNOSIS- appendicitis, colitis, diverticulitis, cholecystitis, constipation, IBS, UTI, ureteral calculus, gyn    Amount and/or Complexity of Data Reviewed  Labs: ordered. Decision-making details documented in ED Course.  Radiology: ordered. Decision-making details documented in ED Course.  Discussion of management or test interpretation with external provider(s): I reviewed patient's case with Dr. Moulton at [facility] ED; I reviewed HPI, past medical history, physical exam, lab findings, x-ray findings, treatment and response to treatment; patient accepted for transfer    Risk  OTC drugs.  Prescription drug management.  Decision regarding hospitalization.  Risk Details: Patient presented with no fever but had low blood pressure, normal pulse; IV started she was given 1000 mL normal saline, morphine 4 mg IV for pain and Zofran 4 mg IV; good control of pain;  CT revealed possible bilateral tubo-ovarian abscess; patient reports vaginal discharge of unknown length of time; will check GC  "chlamydia from urine and start Rocephin 1 g IV, Flagyl 500 mg IV and doxycycline 100 mg; plan transfer to a gynecology facility for further workup and evaluation, ultrasound for further delineation of possible abscess or ovarian cyst               ED Course as of 02/04/25 2017 Mon Feb 03, 2025   2357 WBC(!): 14.31 [DD]   Tue Feb 04, 2025   0004 Lactic Acid Level(!): 2.3 [DD]   0120 Bacteria, UA(!): Many [DD]   0120 Mucous, UA(!): Moderate [DD]   0120 WBC, UA(!): 11-20 [DD]   0120 Amphetamines, Urine(!): Positive [DD]   0120 Opiates, Urine(!): Positive [DD]      ED Course User Index  [DD] Narendra Frey MD          Patient Vitals for the past 24 hrs:   BP Temp Pulse Resp SpO2 Height Weight   02/04/25 0147 116/76 -- 104 -- 100 % -- --   02/04/25 0133 107/76 -- 100 -- 100 % -- --   02/04/25 0113 116/71 -- 91 -- 99 % -- --   02/04/25 0103 120/78 -- 92 -- 99 % -- --   02/04/25 0045 -- -- 95 -- 99 % -- --   02/04/25 0041 -- -- 104 -- 100 % -- --   02/04/25 0040 123/87 -- -- -- -- -- --   02/04/25 0017 121/78 -- 87 -- 100 % -- --   02/04/25 0009 122/76 -- 87 -- 100 % -- --   02/03/25 2331 -- -- -- (!) 22 -- -- --   02/03/25 2313 (!) 100/38 97.8 °F (36.6 °C) 81 18 100 % 5' 5" (1.651 m) 49.9 kg (110 lb)                      Clinical Impression:  Final diagnoses:  [N70.93] Tubo-ovarian abscess (Primary)  [N89.8] Vaginal discharge          ED Disposition Condition    Transfer to Another Facility Stable                Narendra Frey MD  02/04/25 0120       Narendra Frey MD  02/04/25 2017    "

## 2025-02-04 NOTE — ED PROVIDER NOTES
Encounter Date: 2/4/2025    SCRIBE #1 NOTE: I, Chai Dubois, am scribing for, and in the presence of,  Kimo Lopez IV, MD. I have scribed the following portions of the note - Other sections scribed: HPI,ROS,PE.       History     Chief Complaint   Patient presents with    TRANSFER     Pt arrives via [facility] for Tubo-ovarian Abscess     23 y/o female with no significant PMHx presents to ED via EMS as a transfer from outside facility for bilateral tubo-ovarian abscesses. Pt reports she started to have bilateral lower abdominal pain that started on 2/3. She denies any nausea, vomiting, vaginal bleeding, or vaginal discharge.     Per chart review - Patient had a CT that showed bilateral tubo-ovarian abscesses. Patient was given rocephin, doxy, flagyl, transferred here     The history is provided by the patient and medical records.     Review of patient's allergies indicates:  No Known Allergies  No past medical history on file.  No past surgical history on file.  No family history on file.     Review of Systems   Constitutional:  Negative for chills and fever.   HENT:  Negative for congestion, rhinorrhea and sore throat.    Eyes:  Negative for visual disturbance.   Respiratory:  Negative for cough and shortness of breath.    Cardiovascular:  Negative for chest pain and leg swelling.   Gastrointestinal:  Positive for abdominal pain (lower abdominal pain). Negative for nausea and vomiting.   Genitourinary:  Negative for dysuria, hematuria, vaginal bleeding and vaginal discharge.   Musculoskeletal:  Negative for joint swelling.   Skin:  Negative for rash.   Neurological:  Negative for weakness.   Psychiatric/Behavioral:  Negative for confusion.        Physical Exam     Initial Vitals [02/04/25 0237]   BP Pulse Resp Temp SpO2   117/82 98 16 98.7 °F (37.1 °C) 100 %      MAP       --         Physical Exam    Nursing note and vitals reviewed.  Constitutional: She is not diaphoretic. No distress.   HENT:   Head: Normocephalic  and atraumatic.   Neck: Neck supple.   Normal range of motion.  Cardiovascular:  Normal rate and regular rhythm.           No murmur heard.  Pulmonary/Chest: Breath sounds normal. No respiratory distress.   Abdominal: Abdomen is soft. She exhibits no distension. There is abdominal tenderness (bilateral lower abdominal tenderness).   Musculoskeletal:      Cervical back: Normal range of motion and neck supple.     Neurological: She is alert and oriented to person, place, and time. She has normal strength. No cranial nerve deficit or sensory deficit.   Skin: Skin is warm. Capillary refill takes less than 2 seconds.   Psychiatric: She has a normal mood and affect.         ED Course   Procedures  Labs Reviewed   LACTIC ACID, PLASMA          Imaging Results              US PELVIS COMPLETE NON OB WITH DOPPLER (XPD) (Preliminary result)  Result time 02/04/25 04:21:10   Procedure changed from US Pelvis Complete Non OB     Preliminary result by Bennett Quinones MD (02/04/25 04:21:10)                   Narrative:    START OF REPORT:  Technique: Transabdominal ultrasound of the pelvis was performed.    Comparison: Correlation is with CT study of the abdomen and pelvis of the same date.    CLINICAL HISTORY: Concern for TOA on CT.    Findings:  Uterus: The uterus measures 7.9 x 5.3 x 3.9 cm. The endometrium measures 14.5 mm in thickness. This is within normal limits.  Adnexa:  Right Ovary: The right ovary measures 4.9 x 4.2 x 3.3 cm. A hypoechoic focus is seen which could reflect a corpus luteum cyst which measures 1.4 x 2.1 x 1.6 cm (series 1, image 29). The right ovarian blood flow is within normal limits.  Left Ovary: The left ovary measures 3.2 x 3.1 x 2.8 cm. A large likely corpus luteum cyst is seen which measures 2.6 x 2.2 x 2.4 cm. The left ovarian blood flow is within normal limits.  Fluid: No free fluid is seen in the pelvis.    Notification: The results were discussed with the emergency room physician (Dr Lopez) prior  to dictation at 2025-02-04 04:21:00 CST.      Impression:  1. No specific evidence for ovarian torsion is identified. Details and other findings as noted above.                                         Medications   lactated ringers bolus 1,000 mL (1,000 mLs Intravenous New Bag 2/4/25 7647)   morphine injection 4 mg (4 mg Intravenous Given 2/4/25 0503)   ondansetron injection 4 mg (4 mg Intravenous Given 2/4/25 0503)     Medical Decision Making  24-year-old transfer from outside hospital for possible bilateral tubo-ovarian abscess  Patient presented there with abdominal pain  Exam as above  Ultrasound shows no evidence of TOA per Radiology - CT findings possible enteritis  Will treat with antibiotics and admit given continue pain white count elevated lactic at outside facility    The differential diagnosis includes, but is not limited to:  Judging by the patient's chief complaint and pertinent history, the patient has the following possible differential diagnoses, including but not limited to the following.  Some of these are deemed to be lower likelihood and some more likely based on my physical exam and history combined with possible lab work and/or imaging studies.   Please see the pertinent studies, and refer to the HPI.  Some of these diagnoses will take further evaluation to fully rule out, perhaps as an outpatient and the patient was encouraged to follow up when discharged for more comprehensive evaluation.    appendicitis, biliary disease, diverticulitis,  AAA, ACS, mesenteric ischemia, intraabdominal abcess, retroperitoneal abcess, gastritis, gastroenteritis, hepatitis, hernia, pancreatitis, inflammatory bowel disease, PUD, SBP, nephrolithiasis, DKA, sickle cell crisis, consitpation, GERD, IBS       Problems Addressed:  Abdominal pain, unspecified abdominal location: acute illness or injury that poses a threat to life or bodily functions  Enteritis: acute illness or injury that poses a threat to life or  bodily functions    Amount and/or Complexity of Data Reviewed  External Data Reviewed: radiology and notes.     Details: Per chart review - Patient had a CT that showed bilateral tubo-ovarian abscesses. Patient was given rocephin, doxy, flagyl, transferred here   Labs: ordered.  Radiology: ordered. Decision-making details documented in ED Course.  Discussion of management or test interpretation with external provider(s): Discussed with radiology he states pelvic ultrasound appears normal no evidence of TOA suspects CT findings were reflective of enteritis    Risk  Prescription drug management.  Decision regarding hospitalization.            Scribe Attestation:   Scribe #1: I performed the above scribed service and the documentation accurately describes the services I performed. I attest to the accuracy of the note.    Attending Attestation:           Physician Attestation for Scribe:  Physician Attestation Statement for Scribe #1: I, Kimo Lopez IV, MD, reviewed documentation, as scribed by Chai Dubois in my presence, and it is both accurate and complete.             ED Course as of 02/04/25 0618 Tue Feb 04, 2025 0313 Per chart review - this is a 24-year-old transfer from outside hospital  With bilateral lower abdominal pain  Patient had a CT that showed bilateral tubo-ovarian abscesses  patietn was given rocephin, doxy, flagyl, transferred here [AC]      ED Course User Index  [AC] Kimo Lopez IV, MD                          ED Disposition Condition    Observation                 Kimo Lopez IV, MD  02/04/25 0618

## 2025-02-04 NOTE — NURSING
Admission documentation completed by the admit nurse. Home med rec complete. Pt did elect for meds to bed with [facility]. 4 Eyes and standing weight to be completed by the admitting floor nurse.

## 2025-02-04 NOTE — ED NOTES
Pt accepted to [facility] ER per Dr Yeager. Report to Kp DOMINGUEZ. [facility] notified of transfer.

## 2025-02-05 LAB
C TRACH DNA SPEC QL NAA+PROBE: NOT DETECTED
N GONORRHOEA DNA SPEC QL NAA+PROBE: NOT DETECTED
SOURCE (OHS): NORMAL

## 2025-02-05 PROCEDURE — 21400001 HC TELEMETRY ROOM

## 2025-02-05 PROCEDURE — 63600175 PHARM REV CODE 636 W HCPCS: Mod: TB,JZ | Performed by: INTERNAL MEDICINE

## 2025-02-05 PROCEDURE — 63600175 PHARM REV CODE 636 W HCPCS: Performed by: NURSE PRACTITIONER

## 2025-02-05 PROCEDURE — 25000003 PHARM REV CODE 250: Performed by: INTERNAL MEDICINE

## 2025-02-05 RX ORDER — POLYETHYLENE GLYCOL 3350 17 G/17G
17 POWDER, FOR SOLUTION ORAL ONCE
Status: DISCONTINUED | OUTPATIENT
Start: 2025-02-05 | End: 2025-02-06 | Stop reason: HOSPADM

## 2025-02-05 RX ORDER — BISACODYL 10 MG/1
10 SUPPOSITORY RECTAL ONCE
Status: DISCONTINUED | OUTPATIENT
Start: 2025-02-05 | End: 2025-02-06 | Stop reason: HOSPADM

## 2025-02-05 RX ORDER — METRONIDAZOLE 500 MG/1
500 TABLET ORAL EVERY 8 HOURS
Status: DISCONTINUED | OUTPATIENT
Start: 2025-02-05 | End: 2025-02-06 | Stop reason: HOSPADM

## 2025-02-05 RX ORDER — DOCUSATE SODIUM 100 MG/1
100 CAPSULE, LIQUID FILLED ORAL 2 TIMES DAILY
Status: DISCONTINUED | OUTPATIENT
Start: 2025-02-05 | End: 2025-02-06 | Stop reason: HOSPADM

## 2025-02-05 RX ORDER — DOXYCYCLINE HYCLATE 100 MG
100 TABLET ORAL EVERY 12 HOURS
Status: DISCONTINUED | OUTPATIENT
Start: 2025-02-05 | End: 2025-02-06 | Stop reason: HOSPADM

## 2025-02-05 RX ADMIN — KETOROLAC TROMETHAMINE 30 MG: 30 INJECTION, SOLUTION INTRAMUSCULAR at 09:02

## 2025-02-05 RX ADMIN — KETOROLAC TROMETHAMINE 30 MG: 30 INJECTION, SOLUTION INTRAMUSCULAR at 08:02

## 2025-02-05 RX ADMIN — DOCUSATE SODIUM 100 MG: 100 CAPSULE, LIQUID FILLED ORAL at 08:02

## 2025-02-05 RX ADMIN — CEFTRIAXONE SODIUM 1 G: 1 INJECTION, POWDER, FOR SOLUTION INTRAMUSCULAR; INTRAVENOUS at 01:02

## 2025-02-05 RX ADMIN — METRONIDAZOLE 500 MG: 500 TABLET ORAL at 08:02

## 2025-02-05 RX ADMIN — DOXYCYCLINE HYCLATE 100 MG: 100 TABLET, COATED ORAL at 08:02

## 2025-02-05 RX ADMIN — METRONIDAZOLE 500 MG: 5 INJECTION, SOLUTION INTRAVENOUS at 08:02

## 2025-02-05 RX ADMIN — KETOROLAC TROMETHAMINE 30 MG: 30 INJECTION, SOLUTION INTRAMUSCULAR at 01:02

## 2025-02-05 RX ADMIN — METRONIDAZOLE 500 MG: 5 INJECTION, SOLUTION INTRAVENOUS at 12:02

## 2025-02-05 NOTE — CONSULTS
[facility] OB/GYN CLINIC NOTE  [facility]  [address] [place] [address]  Phone: [phone]  Fax: [phone]    [facility] Gynecology Consult - Resident Note    Consulting Physician:   Subjective:        HPI:   Verona Moore is an 24 y.o.  who has no past medical history on file. and presented to an OSH for LLQ/RLQ abdominal pain. She was in her usual state of health prior to arrival and then had acute onset abd pain. She was seen at an OSH with workup notable for leukcytosis of 14k and CT A/P concerning for possible hydrosalpinx and trace pelvic fluid. She received IV Rocephin, Flagyl and PO Doxycycline and was transferred to [facility] for further workup. While in the [facility] ED,  a TVUS was performed which was notable for bilateral corpus luteum cysts (about 2cm), but no free fluid or TOA. Since admission at [facility], she has been on IV Rocephin and Flagyl for management and prn Toradol.     Overnight, she states that her pain has improved, but still endorses abdominal soreness.She denies nausea/emesis, fevers, chills, or abnormal vaginal discharge. She does endorse post-coital bleeding for the past few days which she has never experienced before. LMP 15d ago. She does endorse regular cyclic menses and is not currently on contraceptives. Her last BM was shortly prior to arrival, and denies constipation. Of note, she reports using meth prior to her arrival in the ED. She has not been seen by a gyn in many years, and is unsure the date of her last pap smear.          Review of systems  Negative unless noted in HPI    Past Medical History  No past medical history on file.    Past Surgical History  No past surgical history on file.    Obstetrical History  , SVDx1    Gynecologic History  Patient's last menstrual period was 2025.  15/R/4-5d  STD history: h/o chlamydia with last pregnancy; s/p treatment   Pap smear history: Denies h/o abnormal pap smears; unsure of date of last pap     Allergies  Review of patient's  allergies indicates:  No Known Allergies    Family History  Denies h/o breast, ovarian, uterine, or colon cancer     Social History  Denies tobacco use   Drinks EtOH socially   Endorses methamphetamine use; denies other drug use     Overview of active problems  There are no active problems to display for this patient.      Medications  No home meds       Current Inpatient medications    Current Facility-Administered Medications:     acetaminophen tablet 1,000 mg, 1,000 mg, Oral, Q6H PRN, Michelle Contreras L, FNP, 1,000 mg at 02/04/25 2347    aluminum-magnesium hydroxide-simethicone 200-200-20 mg/5 mL suspension 30 mL, 30 mL, Oral, QID PRN, Michelle Contreras L, FNP    bisacodyL suppository 10 mg, 10 mg, Rectal, Daily PRN, Michelle Contreras, FNP    cefTRIAXone injection 1 g, 1 g, Intravenous, Q24H, Alix Loving AGACNP-BC, 1 g at 02/05/25 0135    dextrose 50% injection 12.5 g, 12.5 g, Intravenous, PRN, Michelle Contreras, FNP    dextrose 50% injection 25 g, 25 g, Intravenous, PRN, Michelle Contreras L, FNP    glucagon (human recombinant) injection 1 mg, 1 mg, Intramuscular, PRN, Michelle Contreras, FNP    glucose chewable tablet 16 g, 16 g, Oral, PRN, Michelle Contreras L, FNP    glucose chewable tablet 24 g, 24 g, Oral, PRN, Michelle Contreras L, FNP    ketorolac injection 30 mg, 30 mg, Intravenous, Q6H PRN, Tom Harvey MD, 30 mg at 02/05/25 0135    melatonin tablet 6 mg, 6 mg, Oral, Nightly PRN, Michelle Contreras, FNP    metronidazole IVPB 500 mg, 500 mg, Intravenous, Q8H, Tom Harvey MD, Stopped at 02/05/25 0119    morphine injection 4 mg, 4 mg, Intravenous, Q4H PRN, Tom Harvey MD    naloxone 0.4 mg/mL injection 0.02 mg, 0.02 mg, Intravenous, PRN, Michelle Contreras, FNP    ondansetron injection 4 mg, 4 mg, Intravenous, Q4H PRN, Michelle Contreras, DEZP    prochlorperazine injection Soln 5 mg, 5 mg, Intravenous, Q6H PRN, Michelle Contreras, DEZP    senna-docusate 8.6-50 mg per tablet 1 tablet, 1 tablet, Oral, BID PRN,  Michelle Contreras FNP    sodium chloride 0.9% flush 10 mL, 10 mL, Intravenous, PRN, Michelle Contreras FNP       Objective:     Vitals:    02/04/25 1421 02/04/25 1947 02/04/25 2332 02/05/25 0405   BP: 107/71 104/68 109/74 108/64   Patient Position:   Lying Lying   Pulse: 78 87 94 96   Resp:  20 18 18   Temp: 97.7 °F (36.5 °C) 98.1 °F (36.7 °C) 99 °F (37.2 °C) 97.8 °F (36.6 °C)   TempSrc: Oral Oral Oral Oral   SpO2: 98% 100% 100% 98%   Weight:       Height:         Body mass index is 18.31 kg/m².    I/O last 3 completed shifts:  In: 240 [P.O.:240]  Out: 300 [Urine:300]    Physical Exam:  General: alert and oriented, in no acute distress   Lungs: clear to auscultation bilaterally   Heart: regular rate and rhythm   Abdomen: Soft, non-distended, mild suprapubic ttp, but no rebound or guarding   DVT Evaluation: No cords or calf tenderness.  No significant calf/ankle edema.   Extremities: Normal, atraumatic, non-edematous   External genitalia: Normal female genitalia without lesion, discharge or tenderness. Bilateral labial hypertrophy    Bimanual Exam: +CMT and uterine tenderness. Uterus approximately 7-8cm in size, mobile. No adnexal masses or fullness.    Speculum Exam: Vaginal vault with physiologic discharge, nonodorous, no lesions/masses seen.  Cervical os visualized as closed, no lesions/masses.   Note: RN chaperone present for entirety of exam.    Results:     LABS  Trended:  Recent Labs   Lab 02/03/25  2342   WBC 14.31*   HGB 11.7*   HCT 38.0      MCV 97.9*   RDW 12.6      K 3.4*      CO2 21*   BUN 14.0   CREATININE 0.84   CALCIUM 9.6   ALBUMIN 4.2   BILITOT 0.5   AST 22   ALT 16   ALKPHOS 94     No results found for this or any previous visit (from the past 24 hours).     MICRO:  Urine GCCT negative   Blood Cx, Ucx negative     IMAGING    CT Abdomen Pelvis With IV Contrast NO Oral Contrast  Narrative: EXAMINATION:  CT ABDOMEN PELVIS WITH IV CONTRAST    CLINICAL HISTORY:  Abdominal pain,  acute, nonlocalized;    TECHNIQUE:  CT imaging of the abdomen and pelvis after intravenous contrast. Dose length product 318 mGycm. Automatic exposure control, adjustment of mA/kV or iterative reconstruction technique used to limit radiation dose.    COMPARISON:  No relevant comparison studies available at the time of dictation.    FINDINGS:  Liver/biliary: Normal liver.  No defined radiodense gallstones. No intra or extrahepatic biliary ductal dilation.    Pancreas: Normal.    Spleen: Normal.    Adrenals: Normal.    Genitourinary: Symmetric renal enhancement. No hydronephrosis. Bladder decompressed and not well evaluated.  Right-sided hydrosalpinx measuring about 12 mm in diameter.    Stomach/bowel: No evidence of bowel obstruction. Normal appendix.    Lymph nodes/peritoneum: No pathologically enlarged lymph node identified. Trace pelvic ascites.    Vasculature: Normal abdominal aortic caliber.    Abdominal wall: Umbilical piercing.    Lung bases: No consolidation or pleural effusion.    Musculoskeletal: No acute osseous findings.  Impression: Trace pelvic ascites with suspected right hydrosalpinx.    Preliminary report for this study mentioned possible tubo-ovarian abscess.  I see no defined fluid collection at this time.  Otherwise, no significant discrepancy with the preliminary report.    Electronically signed by: Filiberto Fernandez  Date:    02/04/2025  Time:    08:52  US PELVIS COMPLETE NON OB WITH DOPPLER (XPD)  Narrative: Technique: Transabdominal ultrasound of the pelvis was performed.    Comparison: Correlation is with CT study of the abdomen and pelvis of the same date.    CLINICAL HISTORY: Concern for TOA on CT.    Findings:    Uterus: The uterus measures 7.9 x 5.3 x 3.9 cm. The endometrium measures 14.5 mm in thickness. This is within normal limits.    Adnexa:    Right Ovary: The right ovary measures 4.9 x 4.2 x 3.3 cm. A hypoechoic focus is seen which could reflect a corpus luteum cyst which measures 1.4 x  2.1 x 1.6 cm (series 1, image 29). The right ovarian blood flow is within normal limits.    Left Ovary: The left ovary measures 3.2 x 3.1 x 2.8 cm. A large likely corpus luteum cyst is seen which measures 2.6 x 2.2 x 2.4 cm. The left ovarian blood flow is within normal limits.    Fluid: No free fluid is seen in the pelvis.    Notification: The results were discussed with the emergency room physician (Dr Lopez) prior to dictation at 2025 04:21:00 CST.  Impression: Impression:    No acute findings with sonography identified.    No significant discrepancy with overnight report.    Electronically signed by: Reymundo Buitrago  Date:    2025  Time:    07:23         Assessment:     Verona Moore is a 24 y.o.  who presented to the ED with abdominal pain and leukocytosis of 14k. Imaging reviewed overall unremarkable for TOA/hydrosalpinx, but significant stool burden. Physical exam c/f PID       Recommendations:     Pelvic Inflammatory Disease  Patient presented to ED with abdominal pain and leuckotysis fo 14k. CT A/P at [facility]  c/f hydrosalpinx. However, upon review of imaging, both are overall unremarkable and without evidence of TOA, hydrosalpinx, or free fluid. There are small bilateral cysts (likely corpus luteum), but these are not likely contributing to patient's pain.   Exam findings notable for CMT and uterine tenderness, c/f PID.   Genital GCCT obtained as this is more sensitive than urine GCCT   Okay to continue IV doxy/flagyl, but would recommend adding on Doxcycline 100mg q12h to adequately treat for PID. If leukocytosis is improving, can transition to PO doxy/flagyl x14d. Will set up outpatient follow-up with [facility] gyn in 2-3 weeks.  Constipation, Stool [place]  CT A/P reviewed, and significant stool burden identified; particularly in the rectum. Would recommend scheduled bowel regimen and prn suppository. Suspect that this will significantly improve patients pain.     Discussed with staff, Dr. Martinez          Katarzyna Ramey MD   PGY2, Obstetrics & Gynecology   [facility]

## 2025-02-05 NOTE — PROGRESS NOTES
Hospital Medicine  Progress Note    Patient Name: Verona Moore  MRN: [id number]  Status: IP- Inpatient   Admission Date: 2/4/2025  Length of Stay: 1  Date of Service: 02/05/2025       CC: hospital follow-up for pelvic pain       SUBJECTIVE   24-year-old female presented to outlying facility with pelvic pain x1 day. Initial imaging was concerning for bilateral TOA, for which patient was transferred here to [facility]. Further review of the imaging notes TOA doubtful, though noted right-sided hydrosalpinx and trace pelvic ascites. STI workup negative for gonorrhea and chlamydia.  Workup did [place] of [id number].  She was admitted to hospital medicine services, and continued on IV antibiotics with ceftriaxone and metronidazole.  GYN consulted.      Today: Patient seen and examined at bedside, and chart reviewed.  Seen by gyn today, recommend continuing treatment for PID.  Patient also constipated.  Otherwise no new issues or events.  She remains afebrile.      MEDICATIONS   Scheduled   bisacodyL  10 mg Rectal Once    docusate sodium  100 mg Oral BID    doxycycline  100 mg Oral Q12H    metroNIDAZOLE  500 mg Oral Q8H    polyethylene glycol  17 g Oral Once     Continuous Infusions  None      PHYSICAL EXAM   VITALS: T 97.3 °F (36.3 °C)   /66   P 78   RR 19   O2 97 %    GENERAL: Awake and in NAD  LUNGS: CTA anteriorly  CVS: Normal rate  GI/: Soft,  bowel sounds positive, TTP mostly in the lower abdomen  EXTREMITIES: No LE edema  NEURO: AAOx3  PSYCH: Cooperative      LABS   CBC  Recent Labs     02/03/25  2342   WBC 14.31*   RBC 3.88*   HGB 11.7*   HCT 38.0   MCV 97.9*   MCH 30.2   MCHC 30.8*   RDW 12.6        CHEM  Recent Labs     02/03/25  2342      K 3.4*   CO2 21*   BUN 14.0   CREATININE 0.84   GLUCOSE 103*   CALCIUM 9.6   ALBUMIN 4.2   GLOBULIN 4.0*   ALKPHOS 94   ALT 16   AST 22   BILITOT 0.5         MICROBIOLOGY     Microbiology Results (last 7 days)       Procedure Component Value Units Date/Time     Chlamydia/GC, PCR [[id number]] Collected: 02/05/25 0743    Order Status: Completed Specimen: Genital from Endocervical Updated: 02/05/25 1029     Chlamydia trachomatis PCR Not Detected     N. gonorrhea PCR Not Detected     Source Endocervical    Narrative:      The Xpert CT/NG test, performed on the GeneXpert system is a qualitative in vitro real-time polymerase chain reaction (PCR) test for the automated detected and differentiation for genomic DNA from Chlamydia trachomatis (CT) and/or Neisseria gonorrhoeae (NG).            ASSESSMENT   Suspected PID  Constipation    PLAN   Continue antibiotics, though will transitioned to oral doxycycline and metronidazole   Will start bowel regimen including a suppository and monitor stooling  If having BMs and pain improved, will consider discharge home tomorrow morning      Prophylaxis: B/L SCDs, early ambulation        Tom Harvey MD  [facility]

## 2025-02-06 ENCOUNTER — TELEPHONE (OUTPATIENT)
Dept: GYNECOLOGY | Facility: CLINIC | Age: 25
End: 2025-02-06
Payer: MEDICAID

## 2025-02-06 VITALS
HEIGHT: 65 IN | TEMPERATURE: 98 F | OXYGEN SATURATION: 97 % | BODY MASS INDEX: 18.33 KG/M2 | RESPIRATION RATE: 24 BRPM | DIASTOLIC BLOOD PRESSURE: 74 MMHG | SYSTOLIC BLOOD PRESSURE: 116 MMHG | HEART RATE: 78 BPM | WEIGHT: 110 LBS

## 2025-02-06 PROBLEM — R10.9 ABDOMINAL PAIN: Status: ACTIVE | Noted: 2025-02-06

## 2025-02-06 LAB
BACTERIA UR CULT: NO GROWTH
ERYTHROCYTE [DISTWIDTH] IN BLOOD BY AUTOMATED COUNT: 12.6 % (ref 11.5–17)
HCT VFR BLD AUTO: 33.9 % (ref 37–47)
HGB BLD-MCNC: 11 G/DL (ref 12–16)
MCH RBC QN AUTO: 31.2 PG (ref 27–31)
MCHC RBC AUTO-ENTMCNC: 32.4 G/DL (ref 33–36)
MCV RBC AUTO: 96 FL (ref 80–94)
NRBC BLD AUTO-RTO: 0 %
PLATELET # BLD AUTO: 231 X10(3)/MCL (ref 130–400)
PMV BLD AUTO: 9.5 FL (ref 7.4–10.4)
RBC # BLD AUTO: 3.53 X10(6)/MCL (ref 4.2–5.4)
WBC # BLD AUTO: 8.36 X10(3)/MCL (ref 4.5–11.5)

## 2025-02-06 PROCEDURE — 25000003 PHARM REV CODE 250: Performed by: INTERNAL MEDICINE

## 2025-02-06 PROCEDURE — 85027 COMPLETE CBC AUTOMATED: CPT | Performed by: INTERNAL MEDICINE

## 2025-02-06 PROCEDURE — 36415 COLL VENOUS BLD VENIPUNCTURE: CPT | Performed by: INTERNAL MEDICINE

## 2025-02-06 PROCEDURE — 63600175 PHARM REV CODE 636 W HCPCS: Mod: JZ,TB | Performed by: INTERNAL MEDICINE

## 2025-02-06 RX ORDER — POLYETHYLENE GLYCOL 3350 17 G/17G
17 POWDER, FOR SOLUTION ORAL DAILY PRN
Qty: 30 PACKET | Refills: 0 | Status: SHIPPED | OUTPATIENT
Start: 2025-02-06

## 2025-02-06 RX ORDER — DOXYCYCLINE HYCLATE 100 MG
100 TABLET ORAL EVERY 12 HOURS
Qty: 26 TABLET | Refills: 0 | Status: SHIPPED | OUTPATIENT
Start: 2025-02-06 | End: 2025-02-19

## 2025-02-06 RX ORDER — AMOXICILLIN 250 MG
1 CAPSULE ORAL EVERY 12 HOURS PRN
Qty: 60 TABLET | Refills: 0 | Status: SHIPPED | OUTPATIENT
Start: 2025-02-06

## 2025-02-06 RX ORDER — METRONIDAZOLE 500 MG/1
500 TABLET ORAL EVERY 8 HOURS
Qty: 39 TABLET | Refills: 0 | Status: SHIPPED | OUTPATIENT
Start: 2025-02-06 | End: 2025-02-19

## 2025-02-06 RX ORDER — ACETAMINOPHEN 500 MG
500 TABLET ORAL EVERY 8 HOURS PRN
Qty: 30 TABLET | Refills: 0 | Status: SHIPPED | OUTPATIENT
Start: 2025-02-06

## 2025-02-06 RX ORDER — ONDANSETRON 4 MG/1
4 TABLET, ORALLY DISINTEGRATING ORAL
Qty: 20 TABLET | Refills: 0 | Status: SHIPPED | OUTPATIENT
Start: 2025-02-06

## 2025-02-06 RX ADMIN — DOXYCYCLINE HYCLATE 100 MG: 100 TABLET, COATED ORAL at 08:02

## 2025-02-06 RX ADMIN — KETOROLAC TROMETHAMINE 30 MG: 30 INJECTION, SOLUTION INTRAMUSCULAR at 11:02

## 2025-02-06 RX ADMIN — METRONIDAZOLE 500 MG: 500 TABLET ORAL at 06:02

## 2025-02-06 RX ADMIN — METRONIDAZOLE 500 MG: 500 TABLET ORAL at 02:02

## 2025-02-06 NOTE — PLAN OF CARE
Discharged home today   02/06/25 0850   Discharge Assessment   Assessment Type Discharge Planning Assessment   Confirmed/corrected address, phone number and insurance Yes   Confirmed Demographics Correct on Facesheet   Source of Information patient   Does patient/caregiver understand observation status   (Inpatient)   Communicated ALEX with patient/caregiver Yes   Reason For Admission Abdominal pain   People in Home significant other   Facility Arrived From: home   Do you expect to return to your current living situation? Yes   Do you have help at home or someone to help you manage your care at home? No   Prior to hospitilization cognitive status: Unable to Assess   Current cognitive status: Alert/Oriented   Walking or Climbing Stairs Difficulty no   Dressing/Bathing Difficulty no   Equipment Currently Used at Home none   Readmission within 30 days? No   Patient currently being followed by outpatient case management? No   Do you currently have service(s) that help you manage your care at home? No   Do you take prescription medications? No   Do you have prescription coverage? Yes   Coverage medicaid   Do you have any problems affording any of your prescribed medications? No   Who is going to help you get home at discharge? sig other   How do you get to doctors appointments? car, drives self   Are you on dialysis? No   Discharge Plan A Home   Discharge Plan B Home   DME Needed Upon Discharge  none   Discharge Plan discussed with: Patient   Transition of Care Barriers None   Physical Activity   On average, how many days per week do you engage in moderate to strenuous exercise (like a brisk walk)? 0 days   On average, how many minutes do you engage in exercise at this level? 0 min   Financial Resource Strain   How hard is it for you to pay for the very basics like food, housing, medical care, and heating? Not hard   Housing Stability   In the last 12 months, was there a time when you were not able to pay the mortgage or  rent on time? N   At any time in the past 12 months, were you homeless or living in a shelter (including now)? N   Transportation Needs   Has the lack of transportation kept you from medical appointments, meetings, work or from getting things needed for daily living? No   Stress   Do you feel stress - tense, restless, nervous, or anxious, or unable to sleep at night because your mind is troubled all the time - these days? Not at all   Social Isolation   How often do you feel lonely or isolated from those around you?  Rarely   Alcohol Use   Q1: How often do you have a drink containing alcohol? Never   Q2: How many drinks containing alcohol do you have on a typical day when you are drinking? None   Q3: How often do you have six or more drinks on one occasion? Never   Utilities   In the past 12 months has the electric, gas, oil, or water company threatened to shut off services in your home? No   Health Literacy   How often do you need to have someone help you when you read instructions, pamphlets, or other written material from your doctor or pharmacy? Never   OTHER   Name(s) of People in Home patient     Discharged home today

## 2025-02-06 NOTE — TELEPHONE ENCOUNTER
----- Message from Med Assistant Sweet sent at 2/6/2025  3:58 PM CST -----  Regarding: FW: Inpatient follow-up    ----- Message -----  From: Katarzyna Ramey MD  Sent: 2/6/2025   3:47 PM CST  To: [facility] Gynecology Clinical Support Staff  Subject: Inpatient follow-up                              Hi,    We were consulted on this patient for PID at [facility]. She'll need to have a follow-up appointment with us in the next 2-3 weeks.       Thanks so much!       Katarzyna Ramey MD   PGY2, Obstetrics & Gynecology   [facility]  
I personally performed the service described in the documentation recorded by the scribe in my presence, and it accurately and completely records my words and actions.

## 2025-02-06 NOTE — PLAN OF CARE
Problem: Adult Inpatient Plan of Care  Goal: Plan of Care Review  2/6/2025 1220 by Leti Gomez LPN  Outcome: Progressing  2/6/2025 1215 by Leti Gomez LPN  Outcome: Progressing  Goal: Patient-Specific Goal (Individualized)  2/6/2025 1220 by Leti Gomez LPN  Outcome: Progressing  2/6/2025 1215 by Leti Gomez LPN  Outcome: Progressing  Goal: Absence of Hospital-Acquired Illness or Injury  2/6/2025 1220 by Leti Gomez LPN  Outcome: Progressing  2/6/2025 1215 by Leti Gomez LPN  Outcome: Progressing  Goal: Optimal Comfort and Wellbeing  2/6/2025 1220 by Leti Gomez LPN  Outcome: Progressing  2/6/2025 1215 by Leti Gomez LPN  Outcome: Progressing  Goal: Readiness for Transition of Care  2/6/2025 1220 by Leti Gomez LPN  Outcome: Progressing  2/6/2025 1215 by Leti Gomez LPN  Outcome: Progressing

## 2025-02-07 NOTE — PHYSICIAN QUERY
Please clarify the infectious disease diagnosis.    Other infectious disease (please specify): SIRS

## 2025-02-09 LAB
BACTERIA BLD CULT: NORMAL
BACTERIA BLD CULT: NORMAL

## 2025-02-26 ENCOUNTER — TELEPHONE (OUTPATIENT)
Dept: GYNECOLOGY | Facility: CLINIC | Age: 25
End: 2025-02-26
Payer: MEDICAID

## 2025-02-27 ENCOUNTER — TELEPHONE (OUTPATIENT)
Dept: GYNECOLOGY | Facility: CLINIC | Age: 25
End: 2025-02-27
Payer: MEDICAID